# Patient Record
Sex: FEMALE | Race: WHITE | NOT HISPANIC OR LATINO | Employment: PART TIME | URBAN - METROPOLITAN AREA
[De-identification: names, ages, dates, MRNs, and addresses within clinical notes are randomized per-mention and may not be internally consistent; named-entity substitution may affect disease eponyms.]

---

## 2021-11-19 ENCOUNTER — TRANSFERRED RECORDS (OUTPATIENT)
Dept: HEALTH INFORMATION MANAGEMENT | Facility: CLINIC | Age: 21
End: 2021-11-19

## 2021-11-29 ENCOUNTER — MEDICAL CORRESPONDENCE (OUTPATIENT)
Dept: HEALTH INFORMATION MANAGEMENT | Facility: CLINIC | Age: 21
End: 2021-11-29

## 2021-12-17 ENCOUNTER — TRANSCRIBE ORDERS (OUTPATIENT)
Dept: MATERNAL FETAL MEDICINE | Facility: CLINIC | Age: 21
End: 2021-12-17

## 2021-12-17 DIAGNOSIS — O26.90 PREGNANCY RELATED CONDITION, ANTEPARTUM: Primary | ICD-10-CM

## 2021-12-22 ENCOUNTER — TRANSFERRED RECORDS (OUTPATIENT)
Dept: HEALTH INFORMATION MANAGEMENT | Facility: CLINIC | Age: 21
End: 2021-12-22
Payer: COMMERCIAL

## 2021-12-22 ENCOUNTER — TRANSCRIBE ORDERS (OUTPATIENT)
Dept: MATERNAL FETAL MEDICINE | Facility: CLINIC | Age: 21
End: 2021-12-22

## 2021-12-22 DIAGNOSIS — O26.90 PREGNANCY RELATED CONDITION, ANTEPARTUM: Primary | ICD-10-CM

## 2021-12-23 ENCOUNTER — PRE VISIT (OUTPATIENT)
Dept: MATERNAL FETAL MEDICINE | Facility: CLINIC | Age: 21
End: 2021-12-23
Payer: COMMERCIAL

## 2021-12-31 ENCOUNTER — HOSPITAL ENCOUNTER (OUTPATIENT)
Dept: ULTRASOUND IMAGING | Facility: CLINIC | Age: 21
End: 2021-12-31
Attending: OBSTETRICS & GYNECOLOGY
Payer: COMMERCIAL

## 2021-12-31 ENCOUNTER — OFFICE VISIT (OUTPATIENT)
Dept: MATERNAL FETAL MEDICINE | Facility: CLINIC | Age: 21
End: 2021-12-31
Attending: OBSTETRICS & GYNECOLOGY
Payer: COMMERCIAL

## 2021-12-31 ENCOUNTER — OFFICE VISIT (OUTPATIENT)
Dept: MATERNAL FETAL MEDICINE | Facility: CLINIC | Age: 21
End: 2021-12-31
Attending: PHYSICIAN ASSISTANT
Payer: COMMERCIAL

## 2021-12-31 DIAGNOSIS — Z36.9 ANTENATAL SCREENING ENCOUNTER: Primary | ICD-10-CM

## 2021-12-31 DIAGNOSIS — O26.90 PREGNANCY RELATED CONDITION, ANTEPARTUM: ICD-10-CM

## 2021-12-31 DIAGNOSIS — Z82.79 FAMILY HISTORY OF CONGENITAL HEART DEFECT: ICD-10-CM

## 2021-12-31 DIAGNOSIS — O09.899 HX OF PRETERM DELIVERY, CURRENTLY PREGNANT: Primary | ICD-10-CM

## 2021-12-31 PROCEDURE — 96040 HC GENETIC COUNSELING, EACH 30 MINUTES: CPT

## 2021-12-31 PROCEDURE — 76813 OB US NUCHAL MEAS 1 GEST: CPT | Mod: 26 | Performed by: OBSTETRICS & GYNECOLOGY

## 2021-12-31 PROCEDURE — 99205 OFFICE O/P NEW HI 60 MIN: CPT | Mod: 25 | Performed by: OBSTETRICS & GYNECOLOGY

## 2021-12-31 PROCEDURE — 76801 OB US < 14 WKS SINGLE FETUS: CPT | Mod: 26 | Performed by: OBSTETRICS & GYNECOLOGY

## 2021-12-31 PROCEDURE — 76801 OB US < 14 WKS SINGLE FETUS: CPT

## 2021-12-31 NOTE — PROGRESS NOTES
Dear Dr. Zuniga,    Thank you for referring your patient Ms. Morrison for a Maternal-Fetal Medicine consultation today.  As you know, she is a 21 year old  at 13w1d by 7wk US with a history of PPROM and PTD at 35 weeks in her last pregnancy. She uses tobacco but otherwise her pregnancy/past medical history are uncomplicated.     She came to me today to discuss recommendations for this pregnancy given her obstetrical history. From review of her records and discussing with the patient it sounds like her last pregnancy was uncomplicated. She recalls being diagnosed and treated for bacterial vaginosis but otherwise denies any infections during pregnancy. She smoked approximately 1/2 ppd throughout this pregnancy. She never experienced contractions or vaginal bleeding. At 35w0d she noticed leaking of fluid and went in for evaluation. She was diagnosed at that time with PPROM and was noted to be 1cm dilated. She was augmented and had an uncomplicated . Her daughter spent 21 days in the hospital but had no complications from prematurity. Placental pathology was not significant.      Today she feels well.  She denies contractions, leakage of fluid and vaginal bleeding.  She is not yet feeling movement. She continues to smoke but has been cutting back since becoming pregnant. She is now down to 3-4 cigarettes per day.     Obstetrical History:    OB History    Para Term  AB Living   3 1 0 1 1 1   SAB IAB Ectopic Multiple Live Births   1 0 0 0 1      # Outcome Date GA Lbr Mark/2nd Weight Sex Delivery Anes PTL Lv   3 Current            2 SAB 21           1  20   2.17 kg (4 lb 12.5 oz)  Vag-Spont  Y RENU     Medical History:   Remote history of depression    Surgical History:   None    Allergies:  Latex    Family History:   No significant family history    Pertinent Data Reviewed:    Initial OB ultrasound 21 - CRL 1cm consistent with 7w1d and an DAE of 22    Low risk first  trimester screen and NT with [facility]    Placental path from 2020 delivery  A) PLACENTA, VAGINAL DELIVERY:   1. Third trimester hernandes placenta with the following characteristics:         a. Weight: 280 grams (less than 10th percentile for 35 weeks gestational age)         b. Membranes/fetal surface:              Fetal membranes with circummarginate insertion (10%)              Negative for chorioamnionitis         c. Umbilical cord:              Three vessel cord              Negative for funisitis         d. Disc/Villi:              Chorionic villi consistent with gestational age              Negative for villitis              Placental disc without infarcts              Focal mild villous edema      Assessment/Recommendations:  21 year old  at 13w1d by 7wk US with a history of PPROM/PTD at 35 weeks, tobacco use, and short interval pregnancy.     In light of the patient s history as listed above my recommendations can be summarized briefly as follows:    History of  delivery  Today we discussed the incidence and epidemiology of  birth (PTB).  birth is defined as a delivery occurring at or after 20 0/7 weeks of gestation and before 37 0/7 weeks of gestation.  birth may be spontaneous (following  labor, PPROM, or cervical insufficiency) or it may be indicated by a specific maternal or fetal complication. Many factors have been associated with  birth, including maternal demographics and characteristics, social and economic factors, medical complications, obstetric history, and conditions specific to the current pregnancy. Several risk factors for  birth are potentially modifiable, including low maternal prepregnancy weight, smoking, substance use, and short interpregnancy interval.     A history of  birth is a very strong predictor of subsequent  birth. The number of prior  births and the degree of prematurity at the prior birth  significantly affect the recurrence risk of  birth. This risk of  birth is highest when the most recent pregnancy was complicated by  birth, or if the patient has a history of multiple  births. After one  birth, the risk of a recurrent  birth is as high as 30%. After two  births, the risk of recurrence is as high as 60%. In the majority of cases, a recurrent  birth will occur at a similar gestational age as the previous birth.    Clinical factors during a current pregnancy that have been associated with an increased risk of  birth include vaginal bleeding, urinary tract infections (UTIs), genital tract infections, multiple gestation, and short cervix.     We discussed that there are two main strategies for  birth prevention in subsequent pregnancies following a spontaneous  delivery: progesterone supplementation and cervical length screening with cerclage placement for short cervix.    The use of progesterone for  birth prevention has been studied since the 1960s, with many reports showing a benefit. IM Progesterone (17-OHPC) demonstrated significant reduction in  birth prior to 35 weeks by one third (20.6% vs 30.7%) in a large multicenter randomized placebo control trial of women with a history of  birth when administered weekly from 16-36 weeks. Administration of 17-OHPC was then broadly recommended for all patients with a prior  birth by the American College of Obstetricians and Gynecologists (ACOG) and the Society for Maternal-Fetal Medicine in 2003. Recently, however, a large RCT follow-up trial (PROLONG) re-evaluated the efficacy of 17-OHPC 250 mg intramuscular injection weekly compared with placebo on  birth and  morbidity among women with a hernandes pregnancy and prior spontaneous  birth. This study did not demonstrate any significant improvement in recurrent  birth. We  discussed that prior trials and the PROLONG trial demonstrated very different demographics and recurrent  birth rates, as the PROLONG trial participants overall had lower risk factors for recurrent  birth. Thus, consideration of progesterone supplementation for individuals at high risk of recurrent  birth should continue and shared decision making should take into account for the body of evidence for progesterone supplementation, the values and preferences of the pregnant individual, the resources available, and the practicalities of the intervention. Patients with a hernandes pregnancy and a prior spontaneous  birth should be offered progesterone supplementation in the context of a shared decision-making process incorporating the available evidence and the patient s preferences. It appears that starting 17-OHPC earlier in the 16 0/7-20 6/7 weeks of gestation period is more effective than starting later.     Assessment of cervical length in the second trimester has also been shown to identify women at increased risk for  birth. Cervical length less than 25 mm before 24 weeks of gestation has a sensitivity of 65.4% for  birth before 35 weeks of gestation. Therefore, in the event of cervical shortening <25mm cerclage placement can be offered as it has been shown to prolong pregnancy and improve  outcomes. Because of the relatively high detection rate and predictive value in individuals with prior  birth, and because treatment is available, serial endovaginal ultrasound measurement of cervical length beginning at 16 0/7 weeks of gestation and repeated until 24 0/7 weeks of gestation for individuals with a hernandes pregnancy and a prior spontaneous  birth is recommended.     Plan:   - 17OHP at 250mg IM weekly from 16-36 weeks gestation   - Serial cervical length measurements every 2 weeks from 16-24 weeks gestation  - Urine culture every trimester with  aggressive treatment of bacteriuria  - Clinical evaluation of  labor symptoms.   - Consider administration of  corticosteroids if the patient is deemed to be at increased risk of imminent  delivery     Tobacco use  We reviewed the risks of smoking in pregnancy which includes spontaneous pregnancy loss, FGR (small for gestational age, low birth weight), abruption, stillbirth,  death, and PPROM due to impaired oxygen delivery. The physiologic mechanism responsible for these adverse consequences appear to be related to carbon monoxide, nicotine and the multiple exposures to the toxins associated with smoking. Carbon monoxide crosses the placenta and decreases the oxygen carrying capacity of the fetal blood. Nicotine also crosses the placenta and significantly decreases uterine blood flow. I discussed with the patient that she should continue her efforts to decrease her tobacco use and ultimately try to achieve complete cessation of tobacco use. It is important to remember that many of these adverse associations appear to be related to a dose dependent response. In other words, the ability to decrease the amount of tobacco use each day especially to that below a half pack per day appears to significantly decrease the risk for delivering a low birth weight  Infant.     At the end of our discussion, Ms. Morrison indicated that her questions were answered and she seemed satisfied with our discussion.  Thank you for the opportunity to participate in your patient s care.  If I can be of any further assistance, please do not hesitate to contact me.      I spent a total of 60 minutes face-to-face with Ms. Zuniga and her partner during today's office visit including reviewing the patient's medical record and documenting in her chart. Over 50% of this time was spent counseling the patient and/or coordinating care. Please see her note for specific details; I have made the necessary  edits/additions.  The patient was also seen for an ultrasound in the Maternal-Fetal Medicine Center at the [facility] today.  For a detailed report of the ultrasound examination, please see the ultrasound report which can be found under the imaging tab.    Sincerely,  Razia Ruiz MD  , OB/GYN  Maternal-Fetal Medicine

## 2021-12-31 NOTE — PROGRESS NOTES
[facility]  Genetic Counseling Consult    Patient: Ashley Morrison YOB: 2000   Date of Service: 21      Ashley Morrison was seen at [facility] for genetic consultation to discuss the options for routine screening for fetal chromosome abnormalities. Ashley was accompanied to the appointment today by her sister, Antonia.       Impression/Plan:   1.  Ashley had an ultrasound and genetic counseling session today. First trimester screen was ordered through primary OB and was screen negative. No additional testing was ordered today.     2. Ashley also had a consultation with [facility] physician Dr. Ruiz today due to her history of  delivery. Please see [facility] consult note for details.      3. Maternal serum AFP (single marker screen) is recommended after 15 weeks to screen for open neural tube defects. A quad screen should not be performed.    Pregnancy History:   /Parity:    Age at Delivery: 21 year old  DAE: 2022, by Ultrasound  Gestational Age: 13w1d    No significant complications or exposures were reported in the current pregnancy.    Ashley ibarra pregnancy history is significant for:  o 2020:  delivery, 35w, PPROM, female  o 2021: SAB    Medical History:   Ashley ibarra reported medical history is not expected to impact pregnancy management or risks to fetal development.       Family History:   A three-generation pedigree was obtained, and is scanned under the  Media  tab.   The following significant findings were reported by Ashley:    The father of the pregnancy, Darwin, is 22 and healthy.    Ashley and Darwin have a 1.5 year old daughter who is healthy and typically developing.     Ashley reports that her mother has a history of three stillborn males, one miscarriage, and one daughter who  of SIDS. All of these pregnancies were with Ashley's father. Ashley's maternal half sister has a history of 3  "miscarriages. This half sister has two healthy children. No additional details are known about the cause of these losses. Family history such as stillbirths, infant deaths, or pregnancy complications can be difficult to assess if this occurred many decades ago since details are typically scarce. Recurrent pregnancy loss can sometimes be due to an underlying cause. Without further information regarding an underlying cause for these losses, an accurate risk assessment cannot be provided and the possibility of an underlying genetic explanation such as a familial chromosomal rearrangement or single gene disorder cannot be ruled out. If more information is collected regarding this family history it should be revisited.   Ashley reports that her brother was born with a \"hole in his heart\" that required surgery after birth, as well as a cyst-like growth on his head that he had surgery on 3 times throughout his childhood. He is otherwise healthy and had typical development. He has not had any genetic testing to Ashley's knowledge. Congenital heart defects can be isolated or associated with multiple birth defects (syndromic). There are many genetic syndromes, single gene disorders or chromosomal abnormalities, that can be associated with congenital heart defects. In the absence of a specific diagnosis, risk assessment with respect to the cyst-like growth is challenging. It is possible there could be a unifying explanation for the congenital heart defect and the cyst-like growth in her brother. However, without a known genetic diagnosis in him, prenatal testing is not available. Isolated congenital heart defects are usually a multi-factorial condition caused by the combination of genetic and environmental factors and familial clustering is not uncommon. Since there is a genetic component to multi-factorial conditions, the recurrence risk is higher for first degree relatives (siblings) than in second degree relatives and " decreases with distance in relationship. We discussed that congenital heart defects are common, occurring in 0.5 to 1.0% live births. The specific recurrence risk for first degree relatives differs according to the type of congenital heart defect and if there is an associated genetic syndrome present. In general, studies show that the overall risk contributed by a positive family history of a congenital heart defect in 1st degree relatives for the same defect is 8.15% whereas the recurrence risk for a different heart defect is 2.68%. There are also recurrence risks specific to the heart defect if this is known. Often risks for second degree relatives are not available. Furthermore, for third degree relatives the risk is likely equal to general population risks. This pregnancy is a second degree relative to the family member with a congenital heart defect; therefore, the recurrence risk is likely slightly elevated from general population risks. We reviewed that often a comprehensive ultrasound is recommended in pregnancies with a second-degree relative with a congenital heart defect.   Ashley reports that her maternal aunt  from a heart attack at age 32. This aunt's daughter also had a heart attack at age 21. Her maternal grandmother had a heart attack later in life, at age 75. There is not a significant family history of hypercholesterolemia or hyperlipidemia to Ashley's knowledge. We briefly discussed that sometimes there can be an inherited condition (familial hypercholesterolemia) in families associated with myocardial infarctions at young ages as well as significantly elevated cholesterol/triglyceride levels. Genetic counseling for cardiovascular conditions is available if the family is interested in more information. Also encouraged sharing this family history information with her primary care provider to ensure appropriate screening.     Ashley reports that Darwin has two paternal aunts and one paternal  uncle who have all had lung cancer. One aunt was diagnosed in her 50's. The other aunt and uncle were diagnosed before age 50. Ashley is unsure whether any of these individuals had exposures such as smoking or other environmental exposures. Darwin's paternal grandfather also passed away from lung cancer in his 80's. We briefly discussed the family history of cancer. We discussed that most cancer seen in families occurs sporadically, but a small percentage may be due to an underlying genetic etiology. We discussed if multiple relatives had lung cancer at young ages without known exposures, that would be rare and could be associated with inherited cancer predisposition syndromes. Genetic counseling is available for cancer syndromes. The most informative individuals to complete cancer genetic counseling and genetic testing are those with a personal history of cancer or those closely related to the affected individuals.     Ashley's maternal half sister has a son with cerebral palsy due to lack of oxygen at birth. We reviewed that there are many different types of cerebral palsy (CP). CP can be caused by delivery complications, brain injury, infections, and certain underlying genetic conditions. Based on the reported history that this relative's cerebral palsy is thought to be due to delivery complications, this history is unlikely to increase risk in the current pregnancy.     Otherwise, the reported family history is negative for multiple miscarriages, stillbirths, birth defects, intellectual disability, known genetic conditions, and consanguinity.       Carrier Screening:   The patient reports that she has  ([place]) ancestry and she and the father of the pregnancy have  ancestry:     Cystic fibrosis is an autosomal recessive genetic condition that occurs with increased frequency in individuals of  ancestry and carrier screening for this condition is available.  In addition,   screening in the [facility] includes cystic fibrosis.      Expanded carrier screening for mutations in a large panel of genes associated with autosomal recessive conditions including cystic fibrosis, spinal muscular atrophy, and others, is now available.      The patient has declined the carrier screening options reviewed today.       Risk Assessment for Chromosome Conditions:   We explained that the risk for fetal chromosome abnormalities increases with maternal age. We discussed specific features of common chromosome abnormalities, including Down syndrome, trisomy 13, trisomy 18, and sex chromosome trisomies.      - At age 21 at midtrimester, the risk to have a baby with Down syndrome is 1 in 1136.    - At age 21 at midtrimester, the risk to have a baby with any chromosome abnormality is 1 in 568.       Ashley had maternal serum screening earlier in pregnancy.    First trimester screening    First trimester screen was done through [employer] [facility] through Ashley's primary OB.     First trimester ultrasound with nuchal translucency assessment, maternal plasma hCG, and ANT-A measurement    Screens for fetal trisomy 21 and trisomy 18    Ashley had a first trimester screen earlier in pregnancy; results were Screen Negative. Results were not available for my review at the time of our visit, so were not specifically reviewed during our visit today. Ashley had not yet been informed of results by her provider.     Screening values were as follows  o Nuchal translucency= 1.54 mm, nasal bone present, hCG= 0.45 MoM, ANT-A= 0.38 MoM    This screen gave the following risk assessments:  o Down syndrome risk= 1:7,400  o Trisomy 18 risk= 1:7,400    Testing Options:   We discussed the following options:   First trimester screening    First trimester ultrasound with nuchal translucency and nasal bone assessments, maternal plasma hCG, ANT-A, and AFP measurement    Screens for fetal trisomy 21, trisomy 13, and trisomy  18    Cannot screen for open neural tube defects; maternal serum AFP after 15 weeks is recommended     Non-invasive Prenatal Testing (NIPT)    Maternal plasma cell-free DNA testing; first trimester ultrasound with nuchal translucency and nasal bone assessment is recommended, when appropriate    Screens for fetal trisomy 21, trisomy 13, trisomy 18, and sex chromosome aneuploidy    Cannot screen for open neural tube defects; maternal serum AFP after 15 weeks is recommended     Chorionic villus sampling (CVS)    Invasive procedure typically performed in the first trimester by which placental villi are obtained for the purpose of chromosome analysis and/or other prenatal genetic analysis    Diagnostic results; >99% sensitivity for fetal chromosome abnormalities    Cannot test for open neural tube defects; maternal serum AFP after 15 weeks is recommended     Genetic Amniocentesis    Invasive procedure typically performed in the second trimester by which amniotic fluid is obtained for the purpose of chromosome analysis and/or other prenatal genetic analysis    Diagnostic results; >99% sensitivity for fetal chromosome abnormalities    AFAFP measurement tests for open neural tube defects     Comprehensive (Level II) ultrasound: Detailed ultrasound performed between 18-22 weeks gestation to screen for major birth defects and markers for aneuploidy.    We reviewed the benefits and limitations of this testing.  Screening tests provide a risk assessment specific to the pregnancy for certain fetal chromosome abnormalities, but cannot definitively diagnose or exclude a fetal chromosome abnormality.  Follow-up genetic counseling and consideration of diagnostic testing is recommended with any abnormal screening result.      It was a pleasure to be involved with Ashley coates. Face-to-face time of the meeting was 35 minutes.    Christen Hammer, [facility], [facility]  Licensed Genetic Counselor  [facility]  Maternal Fetal Medicine  Phone:  [phone]  [email or web address]

## 2022-01-21 ENCOUNTER — HOSPITAL ENCOUNTER (OUTPATIENT)
Dept: ULTRASOUND IMAGING | Facility: CLINIC | Age: 22
End: 2022-01-21
Attending: OBSTETRICS & GYNECOLOGY
Payer: COMMERCIAL

## 2022-01-21 ENCOUNTER — OFFICE VISIT (OUTPATIENT)
Dept: MATERNAL FETAL MEDICINE | Facility: CLINIC | Age: 22
End: 2022-01-21
Attending: OBSTETRICS & GYNECOLOGY
Payer: COMMERCIAL

## 2022-01-21 DIAGNOSIS — O26.90 PREGNANCY RELATED CONDITION, ANTEPARTUM: ICD-10-CM

## 2022-01-21 DIAGNOSIS — O09.899 HX OF PRETERM DELIVERY, CURRENTLY PREGNANT: ICD-10-CM

## 2022-01-21 DIAGNOSIS — O09.899 HX OF PRETERM DELIVERY, CURRENTLY PREGNANT: Primary | ICD-10-CM

## 2022-01-21 PROCEDURE — 76817 TRANSVAGINAL US OBSTETRIC: CPT

## 2022-01-21 PROCEDURE — 76817 TRANSVAGINAL US OBSTETRIC: CPT | Mod: 26 | Performed by: OBSTETRICS & GYNECOLOGY

## 2022-01-21 NOTE — PROGRESS NOTES
The patient was seen for an ultrasound in the Maternal-Fetal Medicine Center at the [facility] today.  For a detailed report of the ultrasound examination, please see the ultrasound report which can be found under the imaging tab.    Razia Ruiz MD  , OB/GYN  Maternal-Fetal Medicine  [phone] (Pager)

## 2022-02-04 ENCOUNTER — HOSPITAL ENCOUNTER (OUTPATIENT)
Dept: ULTRASOUND IMAGING | Facility: CLINIC | Age: 22
End: 2022-02-04
Attending: OBSTETRICS & GYNECOLOGY
Payer: COMMERCIAL

## 2022-02-04 ENCOUNTER — OFFICE VISIT (OUTPATIENT)
Dept: MATERNAL FETAL MEDICINE | Facility: CLINIC | Age: 22
End: 2022-02-04
Attending: OBSTETRICS & GYNECOLOGY
Payer: COMMERCIAL

## 2022-02-04 DIAGNOSIS — O09.899 HX OF PRETERM DELIVERY, CURRENTLY PREGNANT: ICD-10-CM

## 2022-02-04 DIAGNOSIS — O09.899 HISTORY OF PRETERM DELIVERY, CURRENTLY PREGNANT: Primary | ICD-10-CM

## 2022-02-04 DIAGNOSIS — O35.BXX0 ECHOGENIC FOCUS OF HEART OF FETUS AFFECTING ANTEPARTUM CARE OF MOTHER, SINGLE OR UNSPECIFIED FETUS: ICD-10-CM

## 2022-02-04 PROCEDURE — 76811 OB US DETAILED SNGL FETUS: CPT

## 2022-02-04 PROCEDURE — 76811 OB US DETAILED SNGL FETUS: CPT | Mod: 26 | Performed by: OBSTETRICS & GYNECOLOGY

## 2022-02-04 PROCEDURE — 76817 TRANSVAGINAL US OBSTETRIC: CPT | Mod: 26 | Performed by: OBSTETRICS & GYNECOLOGY

## 2022-02-04 NOTE — PROGRESS NOTES
"Please see \"Imaging\" tab under \"Chart Review\" for details of today's visit.    Yisel Terrazas    "

## 2022-02-22 ENCOUNTER — HOSPITAL ENCOUNTER (OUTPATIENT)
Dept: ULTRASOUND IMAGING | Facility: CLINIC | Age: 22
End: 2022-02-22
Attending: OBSTETRICS & GYNECOLOGY
Payer: COMMERCIAL

## 2022-02-22 ENCOUNTER — OFFICE VISIT (OUTPATIENT)
Dept: MATERNAL FETAL MEDICINE | Facility: CLINIC | Age: 22
End: 2022-02-22
Attending: OBSTETRICS & GYNECOLOGY
Payer: COMMERCIAL

## 2022-02-22 DIAGNOSIS — O09.899 HISTORY OF PRETERM DELIVERY, CURRENTLY PREGNANT: Primary | ICD-10-CM

## 2022-02-22 DIAGNOSIS — O09.899 HISTORY OF PRETERM DELIVERY, CURRENTLY PREGNANT: ICD-10-CM

## 2022-02-22 PROCEDURE — 76817 TRANSVAGINAL US OBSTETRIC: CPT

## 2022-02-22 PROCEDURE — 76817 TRANSVAGINAL US OBSTETRIC: CPT | Mod: 26 | Performed by: OBSTETRICS & GYNECOLOGY

## 2022-02-22 NOTE — PROGRESS NOTES
"Please see \"Imaging\" tab under \"Chart Review\" for details of today's US at the [facility].    Franklin Alvarado MD  Maternal-Fetal Medicine    "

## 2022-02-23 ENCOUNTER — TELEPHONE (OUTPATIENT)
Dept: MATERNAL FETAL MEDICINE | Facility: CLINIC | Age: 22
End: 2022-02-23
Payer: COMMERCIAL

## 2022-02-23 NOTE — TELEPHONE ENCOUNTER
RN from [facility] returning call made yesterday regarding patient's appointments. TV length assessments have not been done by Primary clinic as recommended by MFM. Patient was able to come in yesterday at our clinic for a TV. We scheduled her for a follow up TV in 2wks. Also reviewed with clinic our recommendation for f/u growth US between 28-30wks. If they would like this done in our clinic, we can schedule when patient is here next. Reviewed with RN our US reports have been sent to the referring provider. Please call with any further questions. Onelia Ferguson RN on 2/23/2022 at 3:16 PM

## 2022-03-08 ENCOUNTER — HOSPITAL ENCOUNTER (OUTPATIENT)
Dept: ULTRASOUND IMAGING | Facility: CLINIC | Age: 22
End: 2022-03-08
Attending: OBSTETRICS & GYNECOLOGY
Payer: COMMERCIAL

## 2022-03-08 ENCOUNTER — OFFICE VISIT (OUTPATIENT)
Dept: MATERNAL FETAL MEDICINE | Facility: CLINIC | Age: 22
End: 2022-03-08
Attending: OBSTETRICS & GYNECOLOGY
Payer: COMMERCIAL

## 2022-03-08 DIAGNOSIS — O09.899 HISTORY OF PRETERM DELIVERY, CURRENTLY PREGNANT: ICD-10-CM

## 2022-03-08 DIAGNOSIS — O09.899 HISTORY OF PRETERM DELIVERY, CURRENTLY PREGNANT: Primary | ICD-10-CM

## 2022-03-08 PROCEDURE — 76817 TRANSVAGINAL US OBSTETRIC: CPT | Mod: 26 | Performed by: OBSTETRICS & GYNECOLOGY

## 2022-03-08 PROCEDURE — 76817 TRANSVAGINAL US OBSTETRIC: CPT

## 2022-03-08 NOTE — PROGRESS NOTES
Please see full imaging report from [employer] program under imaging tab.    Kevin Osborne MD  Maternal Fetal Medicine

## 2022-04-03 ENCOUNTER — HEALTH MAINTENANCE LETTER (OUTPATIENT)
Age: 22
End: 2022-04-03

## 2022-10-03 ENCOUNTER — HEALTH MAINTENANCE LETTER (OUTPATIENT)
Age: 22
End: 2022-10-03

## 2023-05-20 ENCOUNTER — HEALTH MAINTENANCE LETTER (OUTPATIENT)
Age: 23
End: 2023-05-20

## 2024-07-27 ENCOUNTER — HEALTH MAINTENANCE LETTER (OUTPATIENT)
Age: 24
End: 2024-07-27